# Patient Record
(demographics unavailable — no encounter records)

---

## 2025-07-18 NOTE — PHYSICAL EXAM
[Chaperoned Physical Exam] : A chaperone was present in the examining room during all aspects of the physical examination. [MA] : MA [Normal] : Anus and perineum: Normal sphincter tone, no masses, no prolapse. [FreeTextEntry2] : Sandor

## 2025-07-18 NOTE — DISCUSSION/SUMMARY
[Reviewed Clinical Lab Test(s)] : Results of clinical tests were reviewed. [Reviewed Radiology Report(s)] : Radiology reports were reviewed. [Reviewed Radiology Film/Image(s)] : Images from radiology studies were reviewed and examined. [FreeTextEntry1] : 27 year old with complex right adnexal mass  I discussed management options with Ms. MCGUIRE in detail.  We discussed that ovarian malignancy can only be diagnosed definitively after surgical excision.  Options of management include surgical excision versus continued close radiographic surveillance.  The CA-125 is normal.  We discussed the limitations of CA-125 and possibility of false negatives.  Risks of surgery include bleeding/blood transfusion/infection/injury to bowel/bladder/ureter/blood vessels/conversion to laparotomy.  Risks of observation include disease progression.   Plan to await results of MRI.  She is leaning towards surgery.  I would recommend ovarian cystectomy given overall low risk of ovarian malignancy.  Postoperative recovery and expectations were discussed.

## 2025-07-18 NOTE — HISTORY OF PRESENT ILLNESS
[FreeTextEntry1] : Referred by (GYN) Dr. Henry   Ms. MCGUIRE is a nulligravida 27 year old female, referred from Dr. Henry, for an ovarian cyst.  She was seen by the GYN for AUB.     7/9/25- Pelvic US:   Uterus- 8.8 x 4.9 x 3.7 cm, septum noted, uterine septum noted   EMS- right endometrium 5 mm   RTO- 6.1 x 4.8 x 3.7 cm, with an ovarian lesion measuring 4.9 x 3.2 x 4.2 cmovarian cyst with low level of echoes and mural nodule with vascularity measuring 1.0 x 0.9 x 1.0 cm    LTO- WNL  7/17/25- MRI pending report   Ca125 - 12, CEA <0.6, HE4 37.3,  13  PMHx- acne PSHx- uterine septum removed, wisdom teeth extraction Family hx of cancer - maternal grandmother with lung cancer, maternal aunt with leukemia, paternal uncle with amyloidosis  She has abdominal bloating/distention and fullness on the right side.  She denies a change in appetite, or a change in weight.  She is tolerating PO without nausea or vomiting.  She denies any change in bowel or bladder habits.  She denies any other associated signs or symptoms.  She is here to discuss further surgical management.   HM Pap- 6/2025- negative, HRHPV (-)